# Patient Record
Sex: MALE | Race: WHITE | Employment: OTHER | URBAN - METROPOLITAN AREA
[De-identification: names, ages, dates, MRNs, and addresses within clinical notes are randomized per-mention and may not be internally consistent; named-entity substitution may affect disease eponyms.]

---

## 2019-06-26 ENCOUNTER — HOSPITAL ENCOUNTER (EMERGENCY)
Age: 69
Discharge: HOME | End: 2019-06-26
Attending: EMERGENCY MEDICINE
Payer: MEDICARE

## 2019-06-26 ENCOUNTER — APPOINTMENT (OUTPATIENT)
Dept: ULTRASOUND IMAGING | Age: 69
End: 2019-06-26
Payer: MEDICARE

## 2019-06-26 VITALS
OXYGEN SATURATION: 98 % | BODY MASS INDEX: 26.48 KG/M2 | SYSTOLIC BLOOD PRESSURE: 150 MMHG | HEIGHT: 70 IN | WEIGHT: 185 LBS | DIASTOLIC BLOOD PRESSURE: 84 MMHG | TEMPERATURE: 98.1 F | HEART RATE: 64 BPM | RESPIRATION RATE: 16 BRPM

## 2019-06-26 DIAGNOSIS — R60.0 LEG EDEMA: Primary | ICD-10-CM

## 2019-06-26 PROCEDURE — 99284 EMERGENCY DEPT VISIT MOD MDM: CPT

## 2019-06-26 PROCEDURE — 93971 EXTREMITY STUDY: CPT
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
## 2019-06-26 NOTE — ED NOTES
Remains a/ox3 and respirations unlabored. No complaints at this time. Waiting on discharge.      José Miguel Foster RN  06/26/19 [id number]

## 2020-09-14 ENCOUNTER — HOSPITAL ENCOUNTER (OUTPATIENT)
Age: 70
Discharge: HOME | End: 2020-09-16
Payer: MEDICARE

## 2020-09-14 LAB — PROSTATE SPECIFIC ANTIGEN: 4.51 NG/ML (ref 0–4)

## 2020-09-14 PROCEDURE — G0103 PSA SCREENING: HCPCS

## 2020-11-05 ENCOUNTER — HOSPITAL ENCOUNTER (OUTPATIENT)
Age: 70
Discharge: HOME | End: 2020-11-07

## 2020-11-05 PROCEDURE — 88305 TISSUE EXAM BY PATHOLOGIST: CPT

## 2021-02-15 ENCOUNTER — HOSPITAL ENCOUNTER (OUTPATIENT)
Age: 71
Discharge: HOME | End: 2021-02-17

## 2021-02-15 LAB
ABO/RH: NORMAL
ANTIBODY SCREEN: NORMAL

## 2021-02-15 PROCEDURE — 86900 BLOOD TYPING SEROLOGIC ABO: CPT

## 2021-02-15 PROCEDURE — 86901 BLOOD TYPING SEROLOGIC RH(D): CPT

## 2021-02-15 PROCEDURE — 86850 RBC ANTIBODY SCREEN: CPT

## 2021-02-15 PROCEDURE — 87081 CULTURE SCREEN ONLY: CPT

## 2021-02-17 LAB — MRSA CULTURE ONLY: NORMAL

## 2021-02-23 ENCOUNTER — HOSPITAL ENCOUNTER (OUTPATIENT)
Age: 71
Discharge: HOME | End: 2021-02-25

## 2021-02-23 LAB
ANION GAP SERPL CALCULATED.3IONS-SCNC: 8 MMOL/L (ref 7–16)
BUN BLDV-MCNC: 21 MG/DL (ref 8–23)
CALCIUM SERPL-MCNC: 8.1 MG/DL (ref 8.6–10.2)
CHLORIDE BLD-SCNC: 103 MMOL/L (ref 98–107)
CO2: 25 MMOL/L (ref 22–29)
CREAT SERPL-MCNC: 1 MG/DL (ref 0.7–1.2)
CREATININE FLUID: 1.1 MG/DL
FLUID TYPE: NORMAL
GFR AFRICAN AMERICAN: >60
GFR NON-AFRICAN AMERICAN: >60 ML/MIN/1.73
GLUCOSE BLD-MCNC: 124 MG/DL (ref 74–99)
HCT VFR BLD CALC: 39.7 % (ref 37–54)
HEMOGLOBIN: 13.1 G/DL (ref 12.5–16.5)
MCH RBC QN AUTO: 28.7 PG (ref 26–35)
MCHC RBC AUTO-ENTMCNC: 33 % (ref 32–34.5)
MCV RBC AUTO: 86.9 FL (ref 80–99.9)
PDW BLD-RTO: 13.6 FL (ref 11.5–15)
PLATELET # BLD: 211 E9/L (ref 130–450)
PMV BLD AUTO: 10.6 FL (ref 7–12)
POTASSIUM SERPL-SCNC: 4.5 MMOL/L (ref 3.5–5)
RBC # BLD: 4.57 E12/L (ref 3.8–5.8)
SODIUM BLD-SCNC: 136 MMOL/L (ref 132–146)
WBC # BLD: 7.9 E9/L (ref 4.5–11.5)

## 2021-02-23 PROCEDURE — 80048 BASIC METABOLIC PNL TOTAL CA: CPT

## 2021-02-23 PROCEDURE — 88305 TISSUE EXAM BY PATHOLOGIST: CPT

## 2021-02-23 PROCEDURE — 88309 TISSUE EXAM BY PATHOLOGIST: CPT

## 2021-02-23 PROCEDURE — 85027 COMPLETE CBC AUTOMATED: CPT

## 2021-02-23 PROCEDURE — 82570 ASSAY OF URINE CREATININE: CPT

## 2021-02-23 PROCEDURE — 88304 TISSUE EXAM BY PATHOLOGIST: CPT

## 2021-04-02 LAB — PROSTATE SPECIFIC ANTIGEN: <0.03 NG/ML (ref 0–4)

## 2021-07-16 LAB — PROSTATE SPECIFIC ANTIGEN: <0.03 NG/ML (ref 0–4)

## 2021-10-18 LAB — PROSTATE SPECIFIC ANTIGEN: <0.03 NG/ML (ref 0–4)

## 2022-04-25 LAB — PROSTATE SPECIFIC ANTIGEN: <0.01 NG/ML (ref 0–4)

## 2022-08-15 ENCOUNTER — HOSPITAL ENCOUNTER (OUTPATIENT)
Age: 72
Discharge: HOME | End: 2022-08-15
Payer: MEDICARE

## 2022-08-15 LAB — PROSTATE SPECIFIC ANTIGEN: <0.01 NG/ML (ref 0–4)

## 2022-08-15 PROCEDURE — G0103 PSA SCREENING: HCPCS

## 2022-08-15 PROCEDURE — 36415 COLL VENOUS BLD VENIPUNCTURE: CPT

## 2023-01-20 ENCOUNTER — HOSPITAL ENCOUNTER (OUTPATIENT)
Dept: CT IMAGING | Age: 73
End: 2023-01-20
Payer: MEDICARE

## 2023-01-20 DIAGNOSIS — R22.1 NECK MASS: ICD-10-CM

## 2023-01-20 DIAGNOSIS — R59.0 CERVICAL LYMPHADENOPATHY: ICD-10-CM

## 2023-01-20 PROCEDURE — 70490 CT SOFT TISSUE NECK W/O DYE: CPT

## 2023-02-13 ENCOUNTER — OFFICE VISIT (OUTPATIENT)
Dept: ENT CLINIC | Age: 73
End: 2023-02-13
Payer: MEDICARE

## 2023-02-13 VITALS
BODY MASS INDEX: 27.92 KG/M2 | HEIGHT: 70 IN | WEIGHT: 195 LBS | SYSTOLIC BLOOD PRESSURE: 138 MMHG | HEART RATE: 57 BPM | DIASTOLIC BLOOD PRESSURE: 77 MMHG

## 2023-02-13 DIAGNOSIS — R22.1 MASS OF NECK: Primary | ICD-10-CM

## 2023-02-13 PROCEDURE — 99204 OFFICE O/P NEW MOD 45 MIN: CPT | Performed by: OTOLARYNGOLOGY

## 2023-02-13 PROCEDURE — 3017F COLORECTAL CA SCREEN DOC REV: CPT | Performed by: OTOLARYNGOLOGY

## 2023-02-13 PROCEDURE — G8417 CALC BMI ABV UP PARAM F/U: HCPCS | Performed by: OTOLARYNGOLOGY

## 2023-02-13 PROCEDURE — 1036F TOBACCO NON-USER: CPT | Performed by: OTOLARYNGOLOGY

## 2023-02-13 PROCEDURE — G8427 DOCREV CUR MEDS BY ELIG CLIN: HCPCS | Performed by: OTOLARYNGOLOGY

## 2023-02-13 PROCEDURE — 1123F ACP DISCUSS/DSCN MKR DOCD: CPT | Performed by: OTOLARYNGOLOGY

## 2023-02-13 PROCEDURE — G8484 FLU IMMUNIZE NO ADMIN: HCPCS | Performed by: OTOLARYNGOLOGY

## 2023-02-13 PROCEDURE — 31575 DIAGNOSTIC LARYNGOSCOPY: CPT | Performed by: OTOLARYNGOLOGY

## 2023-02-13 RX ORDER — LATANOPROST 50 UG/ML
SOLUTION/ DROPS OPHTHALMIC
COMMUNITY
Start: 2023-01-28

## 2023-02-13 RX ORDER — METOPROLOL SUCCINATE 50 MG/1
TABLET, EXTENDED RELEASE ORAL
COMMUNITY
Start: 2023-02-05

## 2023-02-13 RX ORDER — CHLORTHALIDONE 25 MG/1
TABLET ORAL
COMMUNITY
Start: 2023-02-05
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
## 2023-02-16 ASSESSMENT — ENCOUNTER SYMPTOMS
VOMITING: 0
COUGH: 0
SHORTNESS OF BREATH: 0

## 2023-03-02 ENCOUNTER — HOSPITAL ENCOUNTER (OUTPATIENT)
Dept: ULTRASOUND IMAGING | Age: 73
Discharge: HOME | End: 2023-03-04
Payer: MEDICARE

## 2023-03-02 DIAGNOSIS — R22.1 MASS OF NECK: ICD-10-CM

## 2023-03-02 PROCEDURE — 2709999900 US FINE NEEDLE ASPIRATION

## 2023-03-02 PROCEDURE — 88305 TISSUE EXAM BY PATHOLOGIST: CPT

## 2023-03-02 PROCEDURE — 88173 CYTOPATH EVAL FNA REPORT: CPT

## 2023-03-06 ENCOUNTER — TELEPHONE (OUTPATIENT)
Dept: ENT CLINIC | Age: 73
End: 2023-03-06

## 2023-03-09 ENCOUNTER — TELEPHONE (OUTPATIENT)
Dept: ENT CLINIC | Age: 73
End: 2023-03-09

## 2023-03-09 NOTE — TELEPHONE ENCOUNTER
Ill call
Patient called into office stating he missed a call from Dr Verónica York regarding his FNA results. Best call back number is [phone]. Please advise.
stage III

## 2023-03-15 ENCOUNTER — TELEPHONE (OUTPATIENT)
Dept: ENT CLINIC | Age: 73
End: 2023-03-15

## 2023-03-15 NOTE — TELEPHONE ENCOUNTER
Prior Authorization Form:      DEMOGRAPHICS:                     Patient Name:  Hemalatha Franco  Patient :  1950            Insurance:  Payor: MEDICARE / Plan: MEDICARE PART A AND B / Product Type: *No Product type* /   Insurance ID Number:    Payer/Plan Subscr  Sex Relation Sub. Ins. ID Effective Group Num   1. Diane 20 1950 Male Self [id number] 1/1/15                                    [address] [address]   2.  MEDICAL Simonasofiegustavo Franciscoen 1950 Male Self [id number]                                   [address]         DIAGNOSIS & PROCEDURE:                       Procedure/Operation: DEEP CERVICAL LYMPH NODE BIOPSY           CPT Code: 12687    Diagnosis:  NECK MASS    ICD10 Code: R22.1    Location:  [facility]    Surgeon:  Zarina Marroquin INFORMATION:                          Date: 3/28/23    Time: 1200              Anesthesia:  General                                                       Status:  Outpatient        Special Comments:  FROZEN SECTION       Electronically signed by Gertrude Oviedo MA on 3/15/2023 at 2:29 PM

## 2023-03-20 ENCOUNTER — OFFICE VISIT (OUTPATIENT)
Dept: ENT CLINIC | Age: 73
End: 2023-03-20
Payer: MEDICARE

## 2023-03-20 VITALS — BODY MASS INDEX: 27.2 KG/M2 | WEIGHT: 190 LBS | HEIGHT: 70 IN

## 2023-03-20 DIAGNOSIS — R22.1 MASS OF NECK: Primary | ICD-10-CM

## 2023-03-20 PROCEDURE — 1123F ACP DISCUSS/DSCN MKR DOCD: CPT | Performed by: OTOLARYNGOLOGY

## 2023-03-20 PROCEDURE — G8428 CUR MEDS NOT DOCUMENT: HCPCS | Performed by: OTOLARYNGOLOGY

## 2023-03-20 PROCEDURE — G8417 CALC BMI ABV UP PARAM F/U: HCPCS | Performed by: OTOLARYNGOLOGY

## 2023-03-20 PROCEDURE — 3017F COLORECTAL CA SCREEN DOC REV: CPT | Performed by: OTOLARYNGOLOGY

## 2023-03-20 PROCEDURE — 99213 OFFICE O/P EST LOW 20 MIN: CPT | Performed by: OTOLARYNGOLOGY

## 2023-03-20 PROCEDURE — 1036F TOBACCO NON-USER: CPT | Performed by: OTOLARYNGOLOGY

## 2023-03-20 PROCEDURE — G8484 FLU IMMUNIZE NO ADMIN: HCPCS | Performed by: OTOLARYNGOLOGY

## 2023-03-20 NOTE — PATIENT INSTRUCTIONS
SURGERY:__3___/__28___/__23___    Nothing to eat or drink after midnight the night before surgery unless surgery is at [facility] or otherwise instructed by the hospital.    DO NOT TAKE ANY ASPIRIN PRODUCTS 7 days prior to surgery. Tylenol only. No Advil, Motrin, Aleve, or Ibuprofen. IF YOU ARE ON BLOOD THINNERS (PLAVIX, COUMADIN, ELIQUIS ETC) THESE WILL ALSO NEED TO BE HELD. Any illegal drugs in your system (including Marijuana even if legally prescribed) will result in your surgery being cancelled. Please be sure to check with our office or the hospital on time frame for the drugs to be out of your system. SHOULD YOUR INSURANCE CHANGE AT ANY TIME YOU MUST CONTACT OUR OFFICE. FAILURE TO DO SO MAY RESULT IN YOUR SURGERY BEING RESCHEDULED OR YOU MAY BE CHARGED AS SELF-PAY. Due to the high demand for surgery at our practice, if you cancel or reschedule your surgery two (2) times we may not reschedule you. If you need FMLA or Short Term Disability paperwork completed for your surgery, please complete your portion, ensure your name and date of birth are on them and fax them to [phone] asap. Paperwork can take up to 2 weeks to be completed. If you have any questions or concerns regarding your surgery, please contact the Surgery SchedulerLaalex Sung at [phone] option 2. If you need medical clearance, you are responsible to contact your physician(s) to schedule an appointment for clearance. If clearance is not completed within 30 days of your surgery it may be cancelled. Our office will fax the appropriate forms that need to be completed to your physician(s). The location of your surgery will call you the day prior to your surgery date to let you know what time you have to be there and any other details. (they usually don't call until late afternoon- early evening.)- IF YOU HAVE QUESTIONS REGARDING THE TIME OF YOUR SURGERY, PLEASE CALL THE FACILITY YOU ARE SCHEDULED AT.              [facility]

## 2023-03-27 RX ORDER — TADALAFIL 20 MG/1
TABLET ORAL
COMMUNITY
Start: 2023-02-24

## 2023-03-28 ENCOUNTER — ANESTHESIA EVENT (OUTPATIENT)
Dept: OPERATING ROOM | Age: 73
End: 2023-03-28
Payer: MEDICARE

## 2023-03-28 ENCOUNTER — ANESTHESIA (OUTPATIENT)
Dept: OPERATING ROOM | Age: 73
End: 2023-03-28
Payer: MEDICARE

## 2023-03-28 ENCOUNTER — HOSPITAL ENCOUNTER (OUTPATIENT)
Age: 73
Setting detail: OUTPATIENT SURGERY
Discharge: HOME | End: 2023-03-28
Attending: OTOLARYNGOLOGY | Admitting: OTOLARYNGOLOGY
Payer: MEDICARE

## 2023-03-28 VITALS
HEART RATE: 61 BPM | BODY MASS INDEX: 27.92 KG/M2 | TEMPERATURE: 97.1 F | DIASTOLIC BLOOD PRESSURE: 68 MMHG | RESPIRATION RATE: 16 BRPM | OXYGEN SATURATION: 97 % | HEIGHT: 70 IN | WEIGHT: 195 LBS | SYSTOLIC BLOOD PRESSURE: 122 MMHG

## 2023-03-28 DIAGNOSIS — C80.1 METASTATIC SQUAMOUS CELL CARCINOMA INVOLVING SOFT TISSUE WITH UNKNOWN PRIMARY SITE (HCC): Primary | ICD-10-CM

## 2023-03-28 DIAGNOSIS — C79.89 METASTATIC SQUAMOUS CELL CARCINOMA INVOLVING SOFT TISSUE WITH UNKNOWN PRIMARY SITE (HCC): Primary | ICD-10-CM

## 2023-03-28 DIAGNOSIS — R22.1 NECK MASS: ICD-10-CM

## 2023-03-28 LAB
ANION GAP SERPL CALCULATED.3IONS-SCNC: 9 MMOL/L (ref 7–16)
BUN SERPL-MCNC: 21 MG/DL (ref 6–23)
CALCIUM SERPL-MCNC: 9.1 MG/DL (ref 8.6–10.2)
CHLORIDE SERPL-SCNC: 103 MMOL/L (ref 98–107)
CO2 SERPL-SCNC: 28 MMOL/L (ref 22–29)
CREAT SERPL-MCNC: 1 MG/DL (ref 0.7–1.2)
EKG ATRIAL RATE: 45 BPM
EKG P AXIS: 29 DEGREES
EKG P-R INTERVAL: 168 MS
EKG Q-T INTERVAL: 452 MS
EKG QRS DURATION: 88 MS
EKG QTC CALCULATION (BAZETT): 390 MS
EKG R AXIS: -23 DEGREES
EKG T AXIS: 30 DEGREES
EKG VENTRICULAR RATE: 45 BPM
GLUCOSE SERPL-MCNC: 100 MG/DL (ref 74–99)
POTASSIUM SERPL-SCNC: 3.9 MMOL/L (ref 3.5–5)
SODIUM SERPL-SCNC: 140 MMOL/L (ref 132–146)

## 2023-03-28 PROCEDURE — 7100000011 HC PHASE II RECOVERY - ADDTL 15 MIN: Performed by: OTOLARYNGOLOGY

## 2023-03-28 PROCEDURE — 2580000003 HC RX 258: Performed by: NURSE ANESTHETIST, CERTIFIED REGISTERED

## 2023-03-28 PROCEDURE — 88342 IMHCHEM/IMCYTCHM 1ST ANTB: CPT

## 2023-03-28 PROCEDURE — 88331 PATH CONSLTJ SURG 1 BLK 1SPC: CPT

## 2023-03-28 PROCEDURE — 88305 TISSUE EXAM BY PATHOLOGIST: CPT

## 2023-03-28 PROCEDURE — 3700000000 HC ANESTHESIA ATTENDED CARE: Performed by: OTOLARYNGOLOGY

## 2023-03-28 PROCEDURE — 7100000000 HC PACU RECOVERY - FIRST 15 MIN: Performed by: OTOLARYNGOLOGY

## 2023-03-28 PROCEDURE — 80048 BASIC METABOLIC PNL TOTAL CA: CPT

## 2023-03-28 PROCEDURE — 2720000010 HC SURG SUPPLY STERILE: Performed by: OTOLARYNGOLOGY

## 2023-03-28 PROCEDURE — 7100000001 HC PACU RECOVERY - ADDTL 15 MIN: Performed by: OTOLARYNGOLOGY

## 2023-03-28 PROCEDURE — 2500000003 HC RX 250 WO HCPCS: Performed by: NURSE ANESTHETIST, CERTIFIED REGISTERED

## 2023-03-28 PROCEDURE — 2500000003 HC RX 250 WO HCPCS: Performed by: OTOLARYNGOLOGY

## 2023-03-28 PROCEDURE — 3600000002 HC SURGERY LEVEL 2 BASE: Performed by: OTOLARYNGOLOGY

## 2023-03-28 PROCEDURE — 2709999900 HC NON-CHARGEABLE SUPPLY: Performed by: OTOLARYNGOLOGY

## 2023-03-28 PROCEDURE — 93005 ELECTROCARDIOGRAM TRACING: CPT

## 2023-03-28 PROCEDURE — 36415 COLL VENOUS BLD VENIPUNCTURE: CPT

## 2023-03-28 PROCEDURE — 3700000001 HC ADD 15 MINUTES (ANESTHESIA): Performed by: OTOLARYNGOLOGY

## 2023-03-28 PROCEDURE — 7100000010 HC PHASE II RECOVERY - FIRST 15 MIN: Performed by: OTOLARYNGOLOGY

## 2023-03-28 PROCEDURE — 38510 BIOPSY/REMOVAL LYMPH NODES: CPT | Performed by: OTOLARYNGOLOGY

## 2023-03-28 PROCEDURE — 2580000003 HC RX 258

## 2023-03-28 PROCEDURE — 6360000002 HC RX W HCPCS: Performed by: ANESTHESIOLOGY

## 2023-03-28 PROCEDURE — 3600000012 HC SURGERY LEVEL 2 ADDTL 15MIN: Performed by: OTOLARYNGOLOGY

## 2023-03-28 PROCEDURE — 6360000002 HC RX W HCPCS: Performed by: NURSE ANESTHETIST, CERTIFIED REGISTERED

## 2023-03-28 PROCEDURE — 6360000002 HC RX W HCPCS

## 2023-03-28 RX ORDER — SODIUM CHLORIDE 0.9 % (FLUSH) 0.9 %
5-40 SYRINGE (ML) INJECTION PRN
Status: DISCONTINUED | OUTPATIENT
Start: 2023-03-28 | End: 2023-03-28 | Stop reason: HOSPADM

## 2023-03-28 RX ORDER — ROCURONIUM BROMIDE 10 MG/ML
INJECTION, SOLUTION INTRAVENOUS PRN
Status: DISCONTINUED | OUTPATIENT
Start: 2023-03-28 | End: 2023-03-28 | Stop reason: SDUPTHER

## 2023-03-28 RX ORDER — PROPOFOL 10 MG/ML
INJECTION, EMULSION INTRAVENOUS PRN
Status: DISCONTINUED | OUTPATIENT
Start: 2023-03-28 | End: 2023-03-28 | Stop reason: SDUPTHER

## 2023-03-28 RX ORDER — SODIUM CHLORIDE 9 MG/ML
INJECTION, SOLUTION INTRAVENOUS PRN
Status: DISCONTINUED | OUTPATIENT
Start: 2023-03-28 | End: 2023-03-28 | Stop reason: HOSPADM

## 2023-03-28 RX ORDER — SODIUM CHLORIDE 9 MG/ML
INJECTION, SOLUTION INTRAVENOUS CONTINUOUS PRN
Status: DISCONTINUED | OUTPATIENT
Start: 2023-03-28 | End: 2023-03-28 | Stop reason: SDUPTHER

## 2023-03-28 RX ORDER — ONDANSETRON 2 MG/ML
INJECTION INTRAMUSCULAR; INTRAVENOUS PRN
Status: DISCONTINUED | OUTPATIENT
Start: 2023-03-28 | End: 2023-03-28 | Stop reason: SDUPTHER

## 2023-03-28 RX ORDER — NEOSTIGMINE METHYLSULFATE 1 MG/ML
INJECTION, SOLUTION INTRAVENOUS PRN
Status: DISCONTINUED | OUTPATIENT
Start: 2023-03-28 | End: 2023-03-28 | Stop reason: SDUPTHER

## 2023-03-28 RX ORDER — LIDOCAINE HYDROCHLORIDE AND EPINEPHRINE 10; 10 MG/ML; UG/ML
INJECTION, SOLUTION INFILTRATION; PERINEURAL PRN
Status: DISCONTINUED | OUTPATIENT
Start: 2023-03-28 | End: 2023-03-28 | Stop reason: ALTCHOICE

## 2023-03-28 RX ORDER — SODIUM CHLORIDE 0.9 % (FLUSH) 0.9 %
5-40 SYRINGE (ML) INJECTION EVERY 12 HOURS SCHEDULED
Status: DISCONTINUED | OUTPATIENT
Start: 2023-03-28 | End: 2023-03-28 | Stop reason: HOSPADM

## 2023-03-28 RX ORDER — LIDOCAINE HYDROCHLORIDE 20 MG/ML
INJECTION, SOLUTION EPIDURAL; INFILTRATION; INTRACAUDAL; PERINEURAL PRN
Status: DISCONTINUED | OUTPATIENT
Start: 2023-03-28 | End: 2023-03-28 | Stop reason: SDUPTHER

## 2023-03-28 RX ORDER — FENTANYL CITRATE 50 UG/ML
INJECTION, SOLUTION INTRAMUSCULAR; INTRAVENOUS PRN
Status: DISCONTINUED | OUTPATIENT
Start: 2023-03-28 | End: 2023-03-28 | Stop reason: SDUPTHER

## 2023-03-28 RX ORDER — GLYCOPYRROLATE 0.2 MG/ML
INJECTION INTRAMUSCULAR; INTRAVENOUS PRN
Status: DISCONTINUED | OUTPATIENT
Start: 2023-03-28 | End: 2023-03-28 | Stop reason: SDUPTHER

## 2023-03-28 RX ORDER — SODIUM CHLORIDE, SODIUM LACTATE, POTASSIUM CHLORIDE, CALCIUM CHLORIDE 600; 310; 30; 20 MG/100ML; MG/100ML; MG/100ML; MG/100ML
INJECTION, SOLUTION INTRAVENOUS CONTINUOUS
Status: DISCONTINUED | OUTPATIENT
Start: 2023-03-28 | End: 2023-03-28 | Stop reason: HOSPADM

## 2023-03-28 RX ORDER — CEPHALEXIN 500 MG/1
500 CAPSULE ORAL 3 TIMES DAILY
Qty: 21 CAPSULE | Refills: 0 | Status: SHIPPED | OUTPATIENT
Start: 2023-03-28 | End: 2023-04-04

## 2023-03-28 RX ORDER — EPHEDRINE SULFATE/0.9% NACL/PF 50 MG/5 ML
SYRINGE (ML) INTRAVENOUS PRN
Status: DISCONTINUED | OUTPATIENT
Start: 2023-03-28 | End: 2023-03-28 | Stop reason: SDUPTHER

## 2023-03-28 RX ORDER — PROCHLORPERAZINE EDISYLATE 5 MG/ML
5 INJECTION INTRAMUSCULAR; INTRAVENOUS
Status: COMPLETED | OUTPATIENT
Start: 2023-03-28 | End: 2023-03-28

## 2023-03-28 RX ORDER — HYDROCODONE BITARTRATE AND ACETAMINOPHEN 5; 325 MG/1; MG/1
1 TABLET ORAL EVERY 6 HOURS PRN
Qty: 12 TABLET | Refills: 0 | Status: SHIPPED | OUTPATIENT
Start: 2023-03-28 | End: 2023-03-31

## 2023-03-28 RX ORDER — DEXAMETHASONE SODIUM PHOSPHATE 4 MG/ML
INJECTION, SOLUTION INTRA-ARTICULAR; INTRALESIONAL; INTRAMUSCULAR; INTRAVENOUS; SOFT TISSUE PRN
Status: DISCONTINUED | OUTPATIENT
Start: 2023-03-28 | End: 2023-03-28 | Stop reason: SDUPTHER

## 2023-03-28 RX ORDER — MIDAZOLAM HYDROCHLORIDE 1 MG/ML
INJECTION INTRAMUSCULAR; INTRAVENOUS PRN
Status: DISCONTINUED | OUTPATIENT
Start: 2023-03-28 | End: 2023-03-28 | Stop reason: SDUPTHER

## 2023-03-28 RX ADMIN — DEXAMETHASONE SODIUM PHOSPHATE 10 MG: 4 INJECTION, SOLUTION INTRAMUSCULAR; INTRAVENOUS at 12:36

## 2023-03-28 RX ADMIN — PROPOFOL 40 MG: 10 INJECTION, EMULSION INTRAVENOUS at 13:08

## 2023-03-28 RX ADMIN — SODIUM CHLORIDE: 9 INJECTION, SOLUTION INTRAVENOUS at 12:13

## 2023-03-28 RX ADMIN — GLYCOPYRROLATE 0.2 MG: 0.2 INJECTION, SOLUTION INTRAMUSCULAR; INTRAVENOUS at 12:29

## 2023-03-28 RX ADMIN — FENTANYL CITRATE 50 MCG: 50 INJECTION, SOLUTION INTRAMUSCULAR; INTRAVENOUS at 12:27

## 2023-03-28 RX ADMIN — Medication 10 MG: at 13:05

## 2023-03-28 RX ADMIN — MIDAZOLAM 2 MG: 1 INJECTION INTRAMUSCULAR; INTRAVENOUS at 12:16

## 2023-03-28 RX ADMIN — LIDOCAINE HYDROCHLORIDE 100 MG: 20 INJECTION, SOLUTION EPIDURAL; INFILTRATION; INTRACAUDAL; PERINEURAL at 12:27

## 2023-03-28 RX ADMIN — Medication 10 MG: at 12:40

## 2023-03-28 RX ADMIN — Medication 3 MG: at 13:27

## 2023-03-28 RX ADMIN — WATER 2000 MG: 1 INJECTION INTRAMUSCULAR; INTRAVENOUS; SUBCUTANEOUS at 12:16

## 2023-03-28 RX ADMIN — PROPOFOL 150 MG: 10 INJECTION, EMULSION INTRAVENOUS at 12:28

## 2023-03-28 RX ADMIN — ROCURONIUM BROMIDE 40 MG: 50 INJECTION, SOLUTION INTRAVENOUS at 12:28

## 2023-03-28 RX ADMIN — SODIUM CHLORIDE: 9 INJECTION, SOLUTION INTRAVENOUS at 13:26

## 2023-03-28 RX ADMIN — PROCHLORPERAZINE EDISYLATE 5 MG: 5 INJECTION INTRAMUSCULAR; INTRAVENOUS at 13:59

## 2023-03-28 RX ADMIN — ONDANSETRON 4 MG: 2 INJECTION INTRAMUSCULAR; INTRAVENOUS at 13:15

## 2023-03-28 RX ADMIN — GLYCOPYRROLATE 0.6 MG: 0.2 INJECTION, SOLUTION INTRAMUSCULAR; INTRAVENOUS at 13:27

## 2023-03-28 ASSESSMENT — PAIN SCALES - GENERAL
PAINLEVEL_OUTOF10: 0
PAINLEVEL_OUTOF10: 0

## 2023-03-28 ASSESSMENT — PAIN - FUNCTIONAL ASSESSMENT: PAIN_FUNCTIONAL_ASSESSMENT: NONE - DENIES PAIN

## 2023-03-28 NOTE — H&P
H&P reviewed, no changes. No issues. Questions and concerns were answered to the patient's satisfaction.  Will proceed with procedure    Will discuss with attending     Electronically signed by Obed Elaine DO on 3/28/23 at 10:20 AM EDT

## 2023-03-28 NOTE — H&P
Lori Kaur was seen and re-examined preoperatively today, March 28, 2023. There was no substantial change in his physical and medical status. Patient is fit for the proposed surgical procedure. All questions were appropriately addressed and had no further questions regarding the risks, benefits, and alternatives of the procedure. Lori Kaur and family wished to proceed.     Eyal Pack DO  Resident Physician  [facility])  Otolaryngology Residency  3/28/2023  11:19 AM

## 2023-03-28 NOTE — PROGRESS NOTES
Discharge instructions gone over with patient, medications reviewed with patient. All questions answered, patient verbalized understanding. Pt has transportation home with family and will have adult supervision. After anesthesia instructions gone over and patient verbalized understanding.
INTRAOPERATIVE CONSULTATION (with FROZEN SECTION)   Neck mass - invasive squamous cell carcinoma
Patient admitted to Stage 2 from Stage 1. No distress noted. VSS. Offered nourishment.
surgery we would greatly appreciate your comments    [] Parent/guardian of a minor must accompany their child and remain on the premises  the entire time they are under our care     [] Pediatric patients may bring favorite toy, blanket or comfort item with them    [] A caregiver or family member must remain with the patient during their stay if they are mentally handicapped, have dementia, disoriented or unable to use a call light or would be a safety concern if left unattended    [x] Please notify surgeon if you develop any illness between now and time of surgery (cold, cough, sore throat, fever, nausea, vomiting) or any signs of infections  including skin, wounds, and dental.    [] Other instructions    EDUCATIONAL MATERIALS PROVIDED:    [] PAT Preoperative Education Packet/Booklet     [] Medication List    [] Fluoroscopy Information Pamphlet    [] Transfusion bracelet applied with instructions    [] Joint replacement video reviewed    [] Shower with antibacterial soap and use CHG wipes provided the evening before surgery as instructed

## 2023-03-28 NOTE — DISCHARGE INSTRUCTIONS
ENT Post-Op Instructions    Follow up with Dr. Maguire in 1 week(s). CALL OFFICE TO SCHEDULE/CONFIRM APPOINTMENT    Please follow the instructions below:    DIET INSTRUCTIONS:  Regular diet. Start with light meals today and increase as tolerated.    ACTIVITY INSTRUCTIONS:  Increase activity as tolerated    Elevate Head of bed   No heavy lifting or strenuous activity until your cleared at your post-operative appointment   No driving while taking pain medication    WOUND/DRESSING INSTRUCTIONS:  May shower normally in 24 hours from time of surgery. May shower from the neck down tonight.      Ice to areas of pain.     You have sutures that dissolve and do not need to be removed.  You have steri-trip bandages (white bandages) over your incision. Leave these in place. Do not remove them. They will fall off in about 1 week. They will curl and peel at the edges, this is normal. They are OK to get wet, but do not soak. Blot dry, do not scrub.   Once the steri strips have fallen off or been removed in the office, place antibiotic ointment on the incision twice a day for 1-2 weeks.   OK to use scar cream after 2 weeks. Use sunscreen when going out in the sun for the first 6 months. Be careful to not extend your head backwards for a few weeks, this puts tension on the incision line and can cause more scarring.      MEDICATION INSTRUCTIONS:  Take medication as prescribed.  When taking pain medications, you may experience dizziness or drowsiness.  Do not drink alcohol or drive when taking these medications.  You may take Ibuprofen (over the counter) as per directions for mild pain.  Do not take any other acetaminophen (Tylenol) products while taking your pain medication.  You may experience constipation while taking narcotic pain medication - You may take over the counter stool softeners: docuscate (Colace) or sennosides S (Senokot - S).     Call physician for any of the following or for questions/concerns:   Fever over 101°  F    Redness, swelling, hardness or warmth at the wound site (s)  Unrelieved nausea/vomiting    Foul smelling or cloudy drainage at the wound site (s)   Unrelieved pain or increase in pain     Increase in shortness of breath
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
## 2023-03-28 NOTE — OP NOTE
3/28/2023  Urbano Hernandez  [id number]      Pre-operative Diagnosis: right neck mass     Post-operative Diagnosis: same    Procedure: excision of right neck mass,  level II    Anesthesia: General endotracheal anesthesia    Surgeons/Assistants: Andrez/Chance     Estimated Blood Loss: 5cc    Complications: None    Specimens: was Obtained right lymph node    Findings: 3cm mixed cystic, solid mass significantly adhered to surrounding tissue with invasion into the SCM, majority went for frozen, the adherent portion in the scm when for permanent. Indication: PT is a 68 y.o.  male who presents with a history of a new onset of enlarged right sided neck mass with inconclusive fna with concern for malignancy. Risks benefits alternative discussed including bit not limited to bleeding infection damage to adjacent structures nerve damage need for further proceed. Patient understands and agrees to proceed. Procedure:  Pt was consented preoperatively, taken to the OR and identified appropriately. Pt was placed in the supine position and given to anesthesia for induction. Once under anesthesia pt was prepped and draped in sterile fashion. Local anesthetic of 1% lidocaine with epinephrine was then injected into the right neck over the mass    A #15 blade scalpel was used to make an incision over the apex of the mass in a relaxed skin tension line. Sharp careful dissection was done around the mass using hemostats and harmonic scalpel. It was significantly adhered to surrounding tissue and there was no well defined capsule. The mass was extending into the SCM as well as along the depth of the IJV. The majority of the mass which was cystic and solid was removed, part sent for frozen. A cuff of the scm that the mass was adhered too was removed as well and sent for pathologic review. The defect was irrigated out with normal saline. Hemostasis was achieved with bipolar cautery.    3-0 vicryl was used to close the deep dermal layer. The skin was closed with 4-0 monocryl in intracuticular layer. Mastisol and steristrips were placed     Patient was then turned back to anesthesia for appropriate awakening. Patient tolerated procedure well.     Dr David Garcia was present and scrubbed for entire case

## 2023-03-29 ASSESSMENT — ENCOUNTER SYMPTOMS
VOMITING: 0
COUGH: 0
SHORTNESS OF BREATH: 0

## 2023-03-29 NOTE — PROGRESS NOTES
Skin:     General: Skin is warm. Findings: No erythema. Neurological:      Mental Status: He is alert. Cranial Nerves: No cranial nerve deficit. IMPRESSION/PLAN:  Patient seen and examined with significant 2-1/2 to 3 cm right-sided neck mass consistent with malignancy of unknown origin. Recommending excisional biopsy, with removal for definitive therapy and diagnosis to meet indications for therapeutics. Once patient has a cellular diagnosis, will progress with intervention as appropriate. All questions were addressed, previous scope was done in the past of the oropharynx tongue base and tonsillar region without any obvious signs of mass tumor or lesion. Dr. Jenae Maguire D.O.  Ms. Nitesh Wise.  Otolaryngology Facial Plastic Surgery  :  [facility] Otolaryngology/Facial Plastic Surgery Residency  Associate Clinical Professor:  Jasmina Bird [facility]

## 2023-04-03 ENCOUNTER — OFFICE VISIT (OUTPATIENT)
Dept: ENT CLINIC | Age: 73
End: 2023-04-03

## 2023-04-03 VITALS — HEIGHT: 70 IN | WEIGHT: 195 LBS | BODY MASS INDEX: 27.92 KG/M2

## 2023-04-03 DIAGNOSIS — C79.89 METASTATIC SQUAMOUS CELL CARCINOMA INVOLVING SOFT TISSUE WITH UNKNOWN PRIMARY SITE (HCC): Primary | ICD-10-CM

## 2023-04-03 DIAGNOSIS — C80.1 METASTATIC SQUAMOUS CELL CARCINOMA INVOLVING SOFT TISSUE WITH UNKNOWN PRIMARY SITE (HCC): Primary | ICD-10-CM

## 2023-04-03 PROCEDURE — 99024 POSTOP FOLLOW-UP VISIT: CPT | Performed by: OTOLARYNGOLOGY

## 2023-04-04 ENCOUNTER — TELEPHONE (OUTPATIENT)
Dept: ENT CLINIC | Age: 73
End: 2023-04-04

## 2023-04-04 NOTE — TELEPHONE ENCOUNTER
Lvm for pt to call regarding PET scan appointment. PET scan scheduled for 4/12/23 @ 8 am at [facility] patient needs to arrive by 7:30 am. [employer], photo ID, insurance card and mask is optional. Registration will be contacting patient to get him registered.      NPO 6 hrs prior  No caffeine 6 hrs prior  No strenuous activity 2 days prior

## 2023-04-17 ENCOUNTER — TELEPHONE (OUTPATIENT)
Dept: ENT CLINIC | Age: 73
End: 2023-04-17

## 2023-04-17 DIAGNOSIS — C80.1 METASTATIC SQUAMOUS CELL CARCINOMA INVOLVING SOFT TISSUE WITH UNKNOWN PRIMARY SITE (HCC): Primary | ICD-10-CM

## 2023-04-17 DIAGNOSIS — C79.89 METASTATIC SQUAMOUS CELL CARCINOMA INVOLVING SOFT TISSUE WITH UNKNOWN PRIMARY SITE (HCC): Primary | ICD-10-CM

## 2023-04-17 NOTE — TELEPHONE ENCOUNTER
Patient called asking for a referral to [facility] in regards to a second opinion, states that he is very happy with how he has been treated by Dr. Sony Ashley but does not have clinton in the local hospitals. Patient states he has contacted [facility] and has not gotten through, and also asked for a referral from PCP and was told \"we don't send referrals, you have to call yourself\". Patient is afraid information will get lost if he starts treatment here and then transfers to [facility]. Please advise.

## 2023-04-17 NOTE — TELEPHONE ENCOUNTER
Referral was placed to radiation oncology which is working primarily in [place], will be able to see my notes we share similar electronic medical records.

## 2023-04-24 ENCOUNTER — TELEPHONE (OUTPATIENT)
Dept: CASE MANAGEMENT | Age: 73
End: 2023-04-24

## 2023-04-24 ENCOUNTER — HOSPITAL ENCOUNTER (OUTPATIENT)
Dept: INFUSION THERAPY | Age: 73
Discharge: HOME | End: 2023-04-24
Payer: MEDICARE

## 2023-04-24 ENCOUNTER — OFFICE VISIT (OUTPATIENT)
Dept: ONCOLOGY | Age: 73
End: 2023-04-24
Payer: MEDICARE

## 2023-04-24 VITALS
HEART RATE: 87 BPM | OXYGEN SATURATION: 96 % | WEIGHT: 194 LBS | HEIGHT: 70 IN | DIASTOLIC BLOOD PRESSURE: 80 MMHG | BODY MASS INDEX: 27.77 KG/M2 | SYSTOLIC BLOOD PRESSURE: 153 MMHG | TEMPERATURE: 98.3 F

## 2023-04-24 DIAGNOSIS — C79.89 METASTATIC SQUAMOUS CELL CARCINOMA INVOLVING SOFT TISSUE WITH UNKNOWN PRIMARY SITE (HCC): Primary | ICD-10-CM

## 2023-04-24 DIAGNOSIS — C80.1 METASTATIC SQUAMOUS CELL CARCINOMA INVOLVING SOFT TISSUE WITH UNKNOWN PRIMARY SITE (HCC): Primary | ICD-10-CM

## 2023-04-24 DIAGNOSIS — Z79.899 OTHER LONG TERM (CURRENT) DRUG THERAPY: ICD-10-CM

## 2023-04-24 DIAGNOSIS — C79.89 METASTATIC SQUAMOUS CELL CARCINOMA INVOLVING SOFT TISSUE WITH UNKNOWN PRIMARY SITE (HCC): ICD-10-CM

## 2023-04-24 DIAGNOSIS — C80.1 METASTATIC SQUAMOUS CELL CARCINOMA INVOLVING SOFT TISSUE WITH UNKNOWN PRIMARY SITE (HCC): ICD-10-CM

## 2023-04-24 DIAGNOSIS — Z11.59 ENCOUNTER FOR SCREENING FOR OTHER VIRAL DISEASES: ICD-10-CM

## 2023-04-24 LAB
ALBUMIN SERPL-MCNC: 4.3 G/DL (ref 3.5–5.2)
ALP SERPL-CCNC: 100 U/L (ref 40–129)
ALT SERPL-CCNC: 19 U/L (ref 0–40)
ANION GAP SERPL CALCULATED.3IONS-SCNC: 8 MMOL/L (ref 7–16)
AST SERPL-CCNC: 23 U/L (ref 0–39)
BASOPHILS # BLD: 0.04 E9/L (ref 0–0.2)
BASOPHILS NFR BLD: 0.7 % (ref 0–2)
BILIRUB SERPL-MCNC: 0.4 MG/DL (ref 0–1.2)
BUN SERPL-MCNC: 22 MG/DL (ref 6–23)
CALCIUM SERPL-MCNC: 9.5 MG/DL (ref 8.6–10.2)
CHLORIDE SERPL-SCNC: 105 MMOL/L (ref 98–107)
CO2 SERPL-SCNC: 29 MMOL/L (ref 22–29)
CREAT SERPL-MCNC: 1 MG/DL (ref 0.7–1.2)
EOSINOPHIL # BLD: 0.05 E9/L (ref 0.05–0.5)
EOSINOPHIL NFR BLD: 0.9 % (ref 0–6)
ERYTHROCYTE [DISTWIDTH] IN BLOOD BY AUTOMATED COUNT: 13.2 FL (ref 11.5–15)
GLUCOSE SERPL-MCNC: 97 MG/DL (ref 74–99)
HCT VFR BLD AUTO: 48.2 % (ref 37–54)
HGB BLD-MCNC: 15.9 G/DL (ref 12.5–16.5)
IMM GRANULOCYTES # BLD: 0.02 E9/L
IMM GRANULOCYTES NFR BLD: 0.4 % (ref 0–5)
LYMPHOCYTES # BLD: 1.54 E9/L (ref 1.5–4)
LYMPHOCYTES NFR BLD: 27 % (ref 20–42)
MCH RBC QN AUTO: 28.4 PG (ref 26–35)
MCHC RBC AUTO-ENTMCNC: 33 % (ref 32–34.5)
MCV RBC AUTO: 86.2 FL (ref 80–99.9)
MONOCYTES # BLD: 0.55 E9/L (ref 0.1–0.95)
MONOCYTES NFR BLD: 9.6 % (ref 2–12)
NEUTROPHILS # BLD: 3.5 E9/L (ref 1.8–7.3)
NEUTS SEG NFR BLD: 61.4 % (ref 43–80)
PLATELET # BLD AUTO: 238 E9/L (ref 130–450)
PMV BLD AUTO: 9.7 FL (ref 7–12)
POTASSIUM SERPL-SCNC: 4.1 MMOL/L (ref 3.5–5)
PROT SERPL-MCNC: 7.1 G/DL (ref 6.4–8.3)
RBC # BLD AUTO: 5.59 E12/L (ref 3.8–5.8)
SODIUM SERPL-SCNC: 142 MMOL/L (ref 132–146)
TSH SERPL-MCNC: 2.66 UIU/ML (ref 0.27–4.2)
WBC # BLD: 5.7 E9/L (ref 4.5–11.5)

## 2023-04-24 PROCEDURE — 36415 COLL VENOUS BLD VENIPUNCTURE: CPT

## 2023-04-24 PROCEDURE — 1036F TOBACCO NON-USER: CPT | Performed by: INTERNAL MEDICINE

## 2023-04-24 PROCEDURE — 1123F ACP DISCUSS/DSCN MKR DOCD: CPT | Performed by: INTERNAL MEDICINE

## 2023-04-24 PROCEDURE — G8427 DOCREV CUR MEDS BY ELIG CLIN: HCPCS | Performed by: INTERNAL MEDICINE

## 2023-04-24 PROCEDURE — 3017F COLORECTAL CA SCREEN DOC REV: CPT | Performed by: INTERNAL MEDICINE

## 2023-04-24 PROCEDURE — 99214 OFFICE O/P EST MOD 30 MIN: CPT

## 2023-04-24 PROCEDURE — 99205 OFFICE O/P NEW HI 60 MIN: CPT | Performed by: INTERNAL MEDICINE

## 2023-04-24 PROCEDURE — G8417 CALC BMI ABV UP PARAM F/U: HCPCS | Performed by: INTERNAL MEDICINE

## 2023-04-24 ASSESSMENT — ENCOUNTER SYMPTOMS
SHORTNESS OF BREATH: 0
VOMITING: 0
COUGH: 0

## 2023-04-24 NOTE — TELEPHONE ENCOUNTER
Met with patient during his  initial consultation with Dr. Jia Miranda  for his  recent head and neck cancer diagnosis. Introduced myself and explained my role with patients receiving treatment at our center. Patient was friendly and receptive. Instructed on next steps including audiology consult and lab work today per Dr. Jia Miranda' recommendations and follow up care. Provided patient with transportation resource list and literature on [address] support services pamphlet, [facility] Cisplatin handout and Patient Resource Head and Neck Cancer booklet. Reviewed resources available to him  such as Social Work and Dietitian. Patient is meeting with dietitian today and will call if he needs to speak with the  as he is unavailable at this time. New patient nursing assessment, medical history, surgical history, family history completed. Medication list reviewed and updated. Provided with my contact information and instructed patient to call me with questions or concerns. Verbalizes understanding. Patient appreciative of visit. Will continue to follow.

## 2023-04-24 NOTE — PROGRESS NOTES
Cresencio Veras  4/24/2023  Wt Readings from Last 10 Encounters:   04/24/23 194 lb (88 kg)   04/13/23 194 lb 6.4 oz (88.2 kg)   04/03/23 195 lb (88.5 kg)   03/28/23 195 lb (88.5 kg)   03/20/23 190 lb (86.2 kg)   02/13/23 195 lb (88.5 kg)   06/26/19 185 lb (83.9 kg)   10/18/12 180 lb (81.6 kg)     Ht Readings from Last 1 Encounters:   04/24/23 5' 10\" (1.778 m)     BMI: 27.84    Assessment: Met w/ pt for introduction during initial consult for metastatic SCC. He reports good appetite w/ stable wt. He denies any odynophagia/dysphagia at this time. Pt is not currently utilizing any ONS products. Discussed role of oncology dietitian in pt's care. Educated pt on expected likely side effects of treatment, including but not limited to anorexia, odynophagia, dysgeusia, xerostomia, etc. Pt reports that \"seemingly everyone knows someone who has had this sort of cancer\" and he has gotten a lot of information from friends an family. He admits he is mostly concerned w/ possibility of PEG placement. Discussed prophylactic vs reactive PEG placement. Educated pt on PEG placement process, noting that given his stable wt, good appetite, and current lack of nutrition related side effects it would not be unrealistic to believe that he would be able to complete treatment w/o the use of supplemental EN though it is always a possibility. Pt denies any further needs at this time. Provided pt w/ contact information, encouraging him to call this clinician as needed.     Weight change: stable  Appetite: good  Nutritional Side Effects: none noted  Calculated Needs: 25-28 kcal/kg CBW =  kcal, 1.3-1.5 gm/kg IBW = 100-110 gm pro, 1 ml/kcal =  ml fluids  Malnutrition Status: None noted  Nutrition Diagnosis: No nutrition dx at this time    Pre-Hab Eligible?: Yes    Recommendations: Pt to continue w/ current eating pattern as tolerated    Sulma Pacheco, MS, RD, LD

## 2023-04-24 NOTE — PROGRESS NOTES
Notified Doctor Aniceto Whipple of abnormal vital signs 153/80 on paper as requested.
Patient provided with discharge instructions, received printed AVS.  All questions answered. Patient understands follow up plan of care.
education given? Yes       TABLE 2   Risk Score Risk Level Plan of Care   0-3 Little or  No Risk 1. Provide assistance as indicated for ambulation activities  2. Reorient confused/cognitively impaired patient  3. Call-light/bell within patient's reach  4. Chair/bed in low position, stretcher/bed with siderails up except when performing patient care activities  5. Educate patient/family/caregiver on falls prevention  6.  Reassess in 12 weeks or with any noted change in patient condition which places them at a risk for a fall   4-6 Moderate Risk 1. Provide assistance as indicated for ambulation activities  2. Reorient confused/cognitively impaired patient  3. Call-light/bell within patient's reach  4. Chair/bed in low position, stretcher/bed with siderails up except when performing patient care activities  5. Educate patient/family/caregiver on falls prevention  6. Falls risk precaution (Yellow sticker Level II) placed on patient chart   7 or   Higher High Risk 1. Place patient in easily observable treatment room  2. Patient attended at all times by family member or staff  3. Provide assistance as indicated for ambulation activities  4. Reorient confused/cognitively impaired patient  5. Call-light/bell within patient's reach  6. Chair/bed in low position, stretcher/bed with siderails up except when performing patient care activities  7. Educate patient/family/caregiver on falls prevention  8. Falls risk precaution (Yellow sticker Level III) placed on patient chart           MALNUTRITION RISK SCREENING ASSESSMENT    Instructions:  Assess the patient and enter the appropriate indicators that are present for nutrition risk identification. Total the numbers entered and assign a risk score. Follow the appropriate action for total score listed below. Assessment   Date  4/24/2023     Have you lost weight without trying? 0- No     Have you been eating poorly because of a decreased appetite?
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 Intradepartmental consultation is obtained    PET/CT scan 04/12/2023:  The enlarged right level 2 cervical lymph node seen on prior CT has decreased in size and there is maximum SUV of 2.9 within this area corresponding to post-biopsy changes. Hypermetabolic right level 3 cervical LN demonstrates max SUV 8.6. Slightly asymmetric uptake at the right base of tongue with max SUV 5.7    Pathology reviewed. Imaging reviewed. NCCN guidelines reviewed. ENT note reviewed. Case discussed with Dr. More Mccabe from [address] [place] [address] team  Recommend concurrent chemoRT with weekly Cisplatin. Side effects reviewed. He agreed to proceed. Authorization to be obtained. Chemo orders entered in [employer]. Labs ordered. Audiology hearing test ordered.   RTC when authorization obtained for Cycle # 1 weekly Cisplatin    Thank you for allowing us to participate in the care of . Raza Hudson MD   4/24/2023

## 2023-04-25 ENCOUNTER — TELEPHONE (OUTPATIENT)
Dept: AUDIOLOGY | Age: 73
End: 2023-04-25

## 2023-04-25 NOTE — TELEPHONE ENCOUNTER
Referral received for hearing test before treatment with ototoxic medication, called patient to schedule testing. Left voicemail.

## 2023-05-01 ENCOUNTER — OFFICE VISIT (OUTPATIENT)
Dept: ENT CLINIC | Age: 73
End: 2023-05-01
Payer: MEDICARE

## 2023-05-01 ENCOUNTER — PROCEDURE VISIT (OUTPATIENT)
Dept: AUDIOLOGY | Age: 73
End: 2023-05-01
Payer: MEDICARE

## 2023-05-01 VITALS — WEIGHT: 194 LBS | HEIGHT: 70 IN | BODY MASS INDEX: 27.77 KG/M2

## 2023-05-01 DIAGNOSIS — R22.1 MASS OF NECK: ICD-10-CM

## 2023-05-01 DIAGNOSIS — H90.3 SENSORINEURAL HEARING LOSS (SNHL) OF BOTH EARS: Primary | ICD-10-CM

## 2023-05-01 DIAGNOSIS — C80.1 METASTATIC SQUAMOUS CELL CARCINOMA INVOLVING SOFT TISSUE WITH UNKNOWN PRIMARY SITE (HCC): Primary | ICD-10-CM

## 2023-05-01 DIAGNOSIS — C79.89 METASTATIC SQUAMOUS CELL CARCINOMA INVOLVING SOFT TISSUE WITH UNKNOWN PRIMARY SITE (HCC): Primary | ICD-10-CM

## 2023-05-01 PROCEDURE — 92567 TYMPANOMETRY: CPT | Performed by: AUDIOLOGIST

## 2023-05-01 PROCEDURE — G8417 CALC BMI ABV UP PARAM F/U: HCPCS | Performed by: OTOLARYNGOLOGY

## 2023-05-01 PROCEDURE — G8427 DOCREV CUR MEDS BY ELIG CLIN: HCPCS | Performed by: OTOLARYNGOLOGY

## 2023-05-01 PROCEDURE — 1123F ACP DISCUSS/DSCN MKR DOCD: CPT | Performed by: OTOLARYNGOLOGY

## 2023-05-01 PROCEDURE — 1036F TOBACCO NON-USER: CPT | Performed by: OTOLARYNGOLOGY

## 2023-05-01 PROCEDURE — 99212 OFFICE O/P EST SF 10 MIN: CPT | Performed by: OTOLARYNGOLOGY

## 2023-05-01 PROCEDURE — 92588 EVOKED AUDITORY TST COMPLETE: CPT | Performed by: AUDIOLOGIST

## 2023-05-01 PROCEDURE — 92557 COMPREHENSIVE HEARING TEST: CPT | Performed by: AUDIOLOGIST

## 2023-05-01 PROCEDURE — 3017F COLORECTAL CA SCREEN DOC REV: CPT | Performed by: OTOLARYNGOLOGY

## 2023-05-01 ASSESSMENT — ENCOUNTER SYMPTOMS
COUGH: 0
SHORTNESS OF BREATH: 0
VOMITING: 0

## 2023-05-01 NOTE — PROGRESS NOTES
This patient was referred for audiometric and tympanometric testing by Dr. Dominique Osborn due to upcoming treatment with ototoxic medications. Patient reported intermittent tinnitus, on the left. He also reported a history of noise exposure. He denied any hearing loss or dizziness. Audiometry using pure tone air and bone conduction testing revealed hearing sensitivity within normal limits through 3000 Hz steeply sloping to an essentially mild sensorineural hearing loss, in the right and hearing sensitivity within normal limits through 2000 Hz steeply sloping to a moderate notch and rising to a mild sensorineural hearing loss, in the left ear. High frequency testing was also completed and revealed hearing in the mild to severe range, bilaterally. Reliability was good. Speech reception thresholds were in good agreement with the pure tone averages, bilaterally. Speech discrimination scores were excellent, bilaterally. Asymmetry noted at 3000 Hz, left ear worse. Distortion Product Otoacoustic Emission (DPOAE) testing was performed bilaterally. DPOAEs present 6121-3068 Hz, bilaterally. Tympanometry revealed normal middle ear peak pressure and compliance, bilaterally. The results were reviewed with the patient. Discussed with patient the need for follow up if he notices any decrease in hearing sensitivity, worsening of tinnitus, or onset of dizziness after beginning treatment. Recommendations for follow up will be made pending physician consult.       Jean-Pierre Askew [facility]  [address] [facility] [id number]   Electronically signed by Jean-Pierre Askew on 5/1/2023 at 11:02 AM

## 2023-05-02 ENCOUNTER — TELEPHONE (OUTPATIENT)
Dept: ONCOLOGY | Age: 73
End: 2023-05-02

## 2023-05-05 ENCOUNTER — HOSPITAL ENCOUNTER (OUTPATIENT)
Dept: INFUSION THERAPY | Age: 73
End: 2023-05-05

## 2024-03-01 ENCOUNTER — HOSPITAL ENCOUNTER (OUTPATIENT)
Age: 74
Discharge: HOME | End: 2024-03-01
Payer: MEDICARE

## 2024-03-01 LAB — PSA SERPL-MCNC: <0.01 NG/ML (ref 0–4)

## 2024-03-01 PROCEDURE — 84153 ASSAY OF PSA TOTAL: CPT

## 2024-03-01 PROCEDURE — 36415 COLL VENOUS BLD VENIPUNCTURE: CPT

## (undated) DEVICE — GAUZE,SPONGE,4"X4",8PLY,STRL,LF,10/TRAY: Brand: MEDLINE

## (undated) DEVICE — DRAIN RND CHN 10FR 1/8 IN FULL FLUT

## (undated) DEVICE — CORD,CAUTERY,BIPOLAR,STERILE: Brand: MEDLINE

## (undated) DEVICE — GLOVE SURG SZ 75 L12IN FNGR THK94MIL TRNSLUC YEL LTX

## (undated) DEVICE — SURGICAL NERVE STIMULATOR/LOCATOR: Brand: CHECKPOINT HEAD & NECK

## (undated) DEVICE — 1000 S-DRAPE TOWEL DRAPE 10/BX: Brand: STERI-DRAPE™

## (undated) DEVICE — Device

## (undated) DEVICE — ELECTRODE PT RET AD L9FT HI MOIST COND ADH HYDRGEL CORDED

## (undated) DEVICE — SHEARS ENDOSCP L9CM CRV HARM FOCS +

## (undated) DEVICE — COVER HNDL LT DISP

## (undated) DEVICE — SYSTEM SURG HEMSTAT PWD 1 GM POLYSACCHARIDE HEMOSPHERES

## (undated) DEVICE — TOWEL,OR,DSP,ST,BLUE,STD,6/PK,12PK/CS: Brand: MEDLINE

## (undated) DEVICE — 4-PORT MANIFOLD: Brand: NEPTUNE 2

## (undated) DEVICE — BASIC SINGLE BASIN 1-LF: Brand: MEDLINE INDUSTRIES, INC.

## (undated) DEVICE — ELECTRODE ELECSURG NDL 2.8 INX7.2 CM COAT INSUL EDGE

## (undated) DEVICE — PACK PROCEDURE SURG GEN CUST

## (undated) DEVICE — STRIP,CLOSURE,WOUND,MEDI-STRIP,1/2X4: Brand: MEDLINE

## (undated) DEVICE — SOLUTION IRRIG 1000ML 0.9% SOD CHL USP POUR PLAS BTL

## (undated) DEVICE — SPONGE,LAP,4"X18",XR,ST,5/PK,40PK/CS: Brand: MEDLINE INDUSTRIES, INC.

## (undated) DEVICE — TUBING SUCT 12FR MAL ALUM SHFT FN CAP VENT UNIV CONN W/ OBT

## (undated) DEVICE — GLOVE ORANGE PI 7   MSG9070

## (undated) DEVICE — SURGICAL PROCEDURE PACK EENT CUST